# Patient Record
Sex: MALE | Race: WHITE | Employment: FULL TIME | URBAN - METROPOLITAN AREA
[De-identification: names, ages, dates, MRNs, and addresses within clinical notes are randomized per-mention and may not be internally consistent; named-entity substitution may affect disease eponyms.]

---

## 2017-11-13 PROBLEM — Z83.49 FAMILY HISTORY OF THYROID DISEASE: Status: ACTIVE | Noted: 2017-11-13

## 2023-01-06 ENCOUNTER — SURGERY CENTER DOCUMENTATION (OUTPATIENT)
Dept: SURGERY | Age: 43
End: 2023-01-06

## 2023-01-06 NOTE — PROCEDURES
[facility] OPERATIVE REPORT:     PATIENT NAME: Diana Villalpando  : 7/15/1980     DATE OF OPERATION:   23    PREOPERATIVE DIAGNOSIS:  Sebaceous cyst of lower back     POSTOPERATIVE DIAGNOSIS: same     PROCEDURE PERFORMED: Excision of large sebaceous cyst of lower back measuring 3 x 3 cm with intermediate closure x 3 cm     SURGEON:  Kaden Elaine MD     ANESTHESIA: Local anesthesia - 1% lidocaine with epinephrine mixed with 0.5% Marcaine and NaHCO3     ESTIMATED BLOOD LOSS:   < 5 ml ml    The patient and his wife understood the indications, details, and potential risks and complications including bleeding, infection, hematoma, seroma, cosmetic deformity, need for reoperation, etc. The patient and his wife consented. The patient was placed in prone position and was prepped and draped. Local anesthesia in the form of 1% Lidocaine with epinephrine and 0.5% Marcaine with 5 ml of NaHCO3 was infused in the form of a field block in all operative fields. A transverse elliptical incision was made and the mass was dissected from the surrounding tissues. The lesion extended to subcutaneous tissue. The lesion was completely excised and the capsule was left intact. Careful hemostasis was obtained. The wound was closed 3-0 Vicryl interrupted sutures to approximate the subcutaneous flaps and 4-0 Vicryl subcuticular for the skin. The skin incisions were dressed with Dermabond. The patient tolerated the procedure well and went home in good condition.   Kaden Elaine MD